# Patient Record
Sex: FEMALE | Race: OTHER | URBAN - METROPOLITAN AREA
[De-identification: names, ages, dates, MRNs, and addresses within clinical notes are randomized per-mention and may not be internally consistent; named-entity substitution may affect disease eponyms.]

---

## 2018-12-28 NOTE — PATIENT DISCUSSION
Steve Visual Field 36 point screen: I have reviewed the visual alcazar, performed by Dr. Sinan Man, both taped and untaped on this patient which demonstrate significant obstruction of the patient's peripheral visual field on both eyes.

## 2019-02-19 NOTE — PATIENT DISCUSSION
Also, please do not hesitate to call us if you have any concerns not addressed by this information. Please call [phone] and we will do everything we can to help you during this period.

## 2022-03-15 ENCOUNTER — PREPPED CHART (OUTPATIENT)
Dept: URBAN - METROPOLITAN AREA CLINIC 49 | Facility: CLINIC | Age: 57
End: 2022-03-15

## 2022-05-26 ENCOUNTER — COMPREHENSIVE EXAM (OUTPATIENT)
Dept: URBAN - METROPOLITAN AREA CLINIC 49 | Facility: CLINIC | Age: 57
End: 2022-05-26

## 2022-05-26 DIAGNOSIS — H25.13: ICD-10-CM

## 2022-05-26 DIAGNOSIS — H40.013: ICD-10-CM

## 2022-05-26 DIAGNOSIS — H43.813: ICD-10-CM

## 2022-05-26 PROCEDURE — 92014 COMPRE OPH EXAM EST PT 1/>: CPT

## 2022-05-26 PROCEDURE — 92134 CPTRZ OPH DX IMG PST SGM RTA: CPT

## 2022-05-26 PROCEDURE — 76514 ECHO EXAM OF EYE THICKNESS: CPT

## 2022-05-26 ASSESSMENT — VISUAL ACUITY
OS_SC: 20/40+2
OS_GLARE: 20/30-1
OS_GLARE: 20/30-1
OD_GLARE: 20/25-1
OU_SC: J1+
OD_SC: 20/30-2
OD_GLARE: 20/30-1
OS_PH: 20/25-2

## 2022-05-26 ASSESSMENT — TONOMETRY
OD_IOP_MMHG: 16
OS_IOP_MMHG: 12
OD_IOP_MMHG: 11
OS_IOP_MMHG: 16

## 2022-05-26 ASSESSMENT — PACHYMETRY
OD_CT_UM: 615
OS_CT_UM: 609

## 2022-07-12 ENCOUNTER — DIAGNOSTICS ONLY (OUTPATIENT)
Dept: URBAN - METROPOLITAN AREA CLINIC 49 | Facility: CLINIC | Age: 57
End: 2022-07-12

## 2022-07-12 DIAGNOSIS — H40.013: ICD-10-CM

## 2022-07-12 PROCEDURE — 92133 CPTRZD OPH DX IMG PST SGM ON: CPT

## 2022-07-12 PROCEDURE — 92083 EXTENDED VISUAL FIELD XM: CPT

## 2022-07-12 NOTE — PATIENT DISCUSSION
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 Start Latanoprost OD HS, rtc 1 month for iop.

## 2022-08-18 ENCOUNTER — FOLLOW UP (OUTPATIENT)
Dept: URBAN - METROPOLITAN AREA CLINIC 49 | Facility: CLINIC | Age: 57
End: 2022-08-18

## 2022-08-18 DIAGNOSIS — H40.013: ICD-10-CM

## 2022-08-18 DIAGNOSIS — H47.011: ICD-10-CM

## 2022-08-18 PROCEDURE — 92012 INTRM OPH EXAM EST PATIENT: CPT

## 2022-08-18 ASSESSMENT — VISUAL ACUITY
OS_PH: 20/25-2
OD_SC: 20/40
OS_SC: 20/40

## 2022-08-18 ASSESSMENT — TONOMETRY
OS_IOP_MMHG: 12
OD_IOP_MMHG: 17
OS_IOP_MMHG: 16
OD_IOP_MMHG: 12

## 2022-08-18 NOTE — PATIENT DISCUSSION
NOT VISUALLY SIGNIFICANT: Informed patient that their cataract is not visually significant or does not meet the criteria for cataract surgery. Recommend attention to cataract symptoms monitoring by regular examinations. Patient instructed to call with changes in vision. Recent Results (from the past 12 hour(s))   CBC W/O DIFF    Collection Time: 02/23/17 12:45 PM   Result Value Ref Range    WBC 7.4 4.3 - 11.1 K/uL    RBC 5.09 4.23 - 5.67 M/uL    HGB 17.8 (H) 13.6 - 17.2 g/dL    HCT 49.1 41.1 - 50.3 %    MCV 96.5 79.6 - 97.8 FL    MCH 35.0 (H) 26.1 - 32.9 PG    MCHC 36.3 (H) 31.4 - 35.0 g/dL    RDW 12.4 11.9 - 14.6 %    PLATELET 225 683 - 537 K/uL    MPV 9.0 (L) 10.8 - 44.5 FL   METABOLIC PANEL, BASIC    Collection Time: 02/23/17 12:45 PM   Result Value Ref Range    Sodium 141 136 - 145 mmol/L    Potassium 4.2 3.5 - 5.1 mmol/L    Chloride 100 98 - 107 mmol/L    CO2 29 21 - 32 mmol/L    Anion gap 12 7 - 16 mmol/L    Glucose 89 65 - 100 mg/dL    BUN 17 8 - 23 MG/DL    Creatinine 1.25 0.8 - 1.5 MG/DL    GFR est AA >60 >60 ml/min/1.73m2    GFR est non-AA >60 >60 ml/min/1.73m2    Calcium 9.7 8.3 - 10.4 MG/DL   Reviewed

## 2022-08-18 NOTE — PATIENT DISCUSSION
Reviewed old records received from a couple different doctors in the past. The records included RNFL OCT's but no HVF's had been done.

## 2022-08-18 NOTE — PATIENT DISCUSSION
Patient has multiple MS like symptoms with extensive testing in the past. Patient has never been diagnosed with MS. Advised due to testing being done about 3 years ago it would be advisable to repeat testing to determine if any markers for MS show up.

## 2022-08-18 NOTE — PATIENT DISCUSSION
Latanoprost was previous started based on recent glaucoma testing but it did not lower IOP. Discussed history of AION OD. Discontinue Latanoprost OD.

## 2024-01-24 ENCOUNTER — COMPREHENSIVE EXAM (OUTPATIENT)
Dept: URBAN - METROPOLITAN AREA CLINIC 49 | Facility: LOCATION | Age: 59
End: 2024-01-24

## 2024-01-24 DIAGNOSIS — H40.013: ICD-10-CM

## 2024-01-24 DIAGNOSIS — H40.1111: ICD-10-CM

## 2024-01-24 DIAGNOSIS — H47.011: ICD-10-CM

## 2024-01-24 DIAGNOSIS — H25.13: ICD-10-CM

## 2024-01-24 PROCEDURE — 92133 CPTRZD OPH DX IMG PST SGM ON: CPT

## 2024-01-24 PROCEDURE — 92015 DETERMINE REFRACTIVE STATE: CPT

## 2024-01-24 PROCEDURE — 99214 OFFICE O/P EST MOD 30 MIN: CPT

## 2024-01-24 RX ORDER — LATANOPROST 50 UG/ML: 1 SOLUTION/ DROPS OPHTHALMIC EVERY EVENING

## 2024-01-24 ASSESSMENT — VISUAL ACUITY
OS_PH: 20/30
OU_CC: J1+
OS_SC: 20/60
OD_PH: 20/25
OD_SC: 20/40

## 2024-01-24 ASSESSMENT — TONOMETRY
OD_IOP_MMHG: 11
OD_IOP_MMHG: 16
OS_IOP_MMHG: 12
OS_IOP_MMHG: 16

## 2024-05-07 ENCOUNTER — FOLLOW UP (OUTPATIENT)
Dept: URBAN - METROPOLITAN AREA CLINIC 53 | Facility: CLINIC | Age: 59
End: 2024-05-07

## 2024-05-07 DIAGNOSIS — H40.1111: ICD-10-CM

## 2024-05-07 DIAGNOSIS — D23.112: ICD-10-CM

## 2024-05-07 DIAGNOSIS — H25.13: ICD-10-CM

## 2024-05-07 PROCEDURE — 99213 OFFICE O/P EST LOW 20 MIN: CPT

## 2024-05-07 PROCEDURE — 92133 CPTRZD OPH DX IMG PST SGM ON: CPT

## 2024-05-07 PROCEDURE — 92083 EXTENDED VISUAL FIELD XM: CPT

## 2024-05-07 ASSESSMENT — VISUAL ACUITY
OS_PH: 20/30
OD_SC: 20/40
OD_PH: 20/25
OS_SC: 20/40

## 2024-05-07 ASSESSMENT — TONOMETRY
OS_IOP_MMHG: 16
OD_IOP_MMHG: 11
OS_IOP_MMHG: 12
OD_IOP_MMHG: 16

## 2024-11-12 ENCOUNTER — COMPREHENSIVE EXAM (OUTPATIENT)
Dept: URBAN - METROPOLITAN AREA CLINIC 53 | Facility: CLINIC | Age: 59
End: 2024-11-12

## 2024-11-12 DIAGNOSIS — D23.112: ICD-10-CM

## 2024-11-12 DIAGNOSIS — H40.1111: ICD-10-CM

## 2024-11-12 DIAGNOSIS — H43.813: ICD-10-CM

## 2024-11-12 DIAGNOSIS — H47.011: ICD-10-CM

## 2024-11-12 DIAGNOSIS — H52.4: ICD-10-CM

## 2024-11-12 DIAGNOSIS — H25.13: ICD-10-CM

## 2024-11-12 PROCEDURE — 92015 DETERMINE REFRACTIVE STATE: CPT

## 2024-11-12 PROCEDURE — 99214 OFFICE O/P EST MOD 30 MIN: CPT

## 2024-12-02 ENCOUNTER — CONTACT LENSES/GLASSES VISIT (OUTPATIENT)
Age: 59
End: 2024-12-02

## 2024-12-02 DIAGNOSIS — Z97.3: ICD-10-CM

## 2024-12-02 PROCEDURE — 92310-5 LEVEL 5 NEW SOFT LENS

## 2024-12-18 ENCOUNTER — CONTACT LENSES/GLASSES VISIT (OUTPATIENT)
Age: 59
End: 2024-12-18

## 2024-12-18 DIAGNOSIS — Z97.3: ICD-10-CM

## 2024-12-18 PROCEDURE — 92310F

## 2025-06-10 ENCOUNTER — FOLLOW UP (OUTPATIENT)
Age: 60
End: 2025-06-10

## 2025-06-10 DIAGNOSIS — H40.1111: ICD-10-CM

## 2025-06-10 DIAGNOSIS — H47.011: ICD-10-CM

## 2025-06-10 PROCEDURE — 92083 EXTENDED VISUAL FIELD XM: CPT

## 2025-06-10 PROCEDURE — 92133 CPTRZD OPH DX IMG PST SGM ON: CPT

## 2025-06-10 PROCEDURE — 99213 OFFICE O/P EST LOW 20 MIN: CPT
